# Patient Record
(demographics unavailable — no encounter records)

---

## 2024-10-14 NOTE — PLAN
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 and the need for gyn oncology referral. Likely anticipated plan of action reviewed with pt and sister. Gyn onc referral given.  F/u annually or as needed.

## 2024-10-14 NOTE — HISTORY OF PRESENT ILLNESS
[LMP unknown] : LMP unknown [postmenopausal] : postmenopausal [N] : Patient denies prior pregnancies [unknown] : Patient is unsure of the date of her LMP [Menarche Age: ____] : age at menarche was [unfilled] [Never active] : never active [TextBox_19] : Pt unsure of when and where she had her last Mammogram [PapSmeardate] : 08/26/2024 [TextBox_31] : Epithelial Cell Abnormality  [BoneDensityDate] : 06/02/2021 [TextBox_37] : Normal [TextBox_43] : Pt has never had a Colonoscopy [PGHxTotal] : 0 [FreeTextEntry1] : Hysterectomy: No   D&C: No Gardasil Vaccine: No

## 2024-10-14 NOTE — END OF VISIT
[FreeTextEntry3] : I, Jacque Borges solely acted as scribe for Dr. Leola Cristobal on 10/09/2024  All medical entries made by the Scribe were at my, Dr. Pepe, direction and personally dictated by me on 10/09/2024 . I have reviewed the chart and agree that the record accurately reflects my personal performance of the history, physical exam, assessment and plan. I have also personally directed, reviewed, and agreed with the chart.

## 2024-10-17 NOTE — CHIEF COMPLAINT
[Other: _____] : [unfilled] [FreeTextEntry1] : [facility] [address] [place] [id number] [phone]  Uterine carcinosarcoma

## 2024-10-17 NOTE — HISTORY OF PRESENT ILLNESS
[FreeTextEntry1] : 57yo G0 virginal female LMP 2021 referred by Dr. 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 Pt presented to GYN in August with period like PMB that occurred in July. Pap smear on 8/26/24 revealed ANTONIETA favor neoplastic. Underwent EMB/hysteroscopy on 9/25/24 and final pathology revealed carcinosarcoma. Denies unintentional weight loss, abdominal or pelvis pain, change in bladder or bowel habits, hematochezia.   Pelvic US 9/24/24-AV uterus 7.93 x 7.29 x 7.67cm, EMS=24mm with thickened, complex, heterogeneous appearance, complex structure see towards the ERNESTINE/Cervix, possible fibroid measuring 2.7 x 1.8 x 2.1cm, Unable to visualize Rt Ovary, Difficult to clearly visualize ovarian tissue, mostly simple Cyst seen measuring 3.2 x 2.9 x 3.3cm with solid appearing structure seen within measuring 1.4 x 1.0 x 1.6cm, additional suspected Para-Ovarian Cyst seen measuring 2.4 x 1.6 x 1.8cm, no free fluid noted. Difficult sonogram due to patient body habitus and patient unable to position properly.  Pathology 9/25/24- 1.  Endometrial biopsy      -   Carcinosarcoma 2.  Endometrial biopsy      -   Carcinosarcoma 3.  Endometrial biopsy      -   Carcinosarcoma  Immunohistochemical stains studies with MMR are pending.  Pap smear-8/26/24-atypical glandular cells favor neoplastic, HPV neg Mammo- 2021- WNL, per patient. Denies prior abnormal mammograms. Colonoscopy- Never DEXA-6/2/21- WNL

## 2024-10-17 NOTE — ASSESSMENT
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   I discussed with the patient and her sister my recommendation for surgical intervention with a hysterectomy at this time. I recommend she have CT C/A/P prior to surgery to rule out metastatic disease. We briefly discussed the role of adjuvant treatment but will defer until final pathology to discuss in great detail. Patient and her sister both expressed their understanding.  I discussed at length with the patient the nature, purpose, risks, benefits, and alternatives of total robotic hysterectomy with bilateral salpingo-oophorectomy, sentinel lymph node dissection, pelvic washings.  The patient understands the risks to include (but not be limited to) bowel injury, bleeding (with the possible need for transfusion), bladder or ureteral injury, infections, deep venous thrombosis, and lamont-operative death.  The patient also understands that her surgery may not be able to be performed laparoscopically and that she may need a laparotomy (either vertical midline or pfannensteil).  She also understands the limitations of laparoscopic surgery and the possibility of missing a surgical complication with need for subsequent re-exploration.  She agrees to proceed.  She asked numerous questions which were answered to her satisfaction. She also understands the rationale for a cystoscopy at the completion of the procedure and the potential risks of cystoscopy.  All questions and concerns were answered to patient's apparent satisfaction.

## 2024-10-17 NOTE — PHYSICAL EXAM
[Chaperone Present] : A chaperone was present in the examining room during all aspects of the physical examination [77081] : A chaperone was present during the pelvic exam. [Normal] : Bimanual Exam: Normal [Ambulatory and capable of all self care but unable to carry out any work activities] : Status 2- Ambulatory and capable of all self care but unable to carry out any work activities. Up and about more than 50% of waking hours [FreeTextEntry2] : Beatriz Johnson PA-C.

## 2024-10-17 NOTE — END OF VISIT
[FreeTextEntry3] : I, Dr. Swati Hudson, personally performed the evaluation and management (E/M) services for this new patient. That E/M includes conducting the initial examination, assessing all conditions, and establishing the plan of care. Today, Beatriz Johnson PA-C, was here to observe my evaluation and management services for this patient to be followed going forward.

## 2024-10-17 NOTE — CHIEF COMPLAINT
[Other: _____] : [unfilled] [FreeTextEntry1] : [facility] [address] [place] [address] [phone]  Uterine carcinosarcoma

## 2024-10-17 NOTE — ASSESSMENT
[FreeTextEntry1] : 57yo virginal female with newly diagnosed uterine carcinosarcoma.   I discussed with the patient and her sister my recommendation for surgical intervention with a hysterectomy at this time. I recommend she have CT C/A/P prior to surgery to rule out metastatic disease. We briefly discussed the role of adjuvant treatment but will defer until final pathology to discuss in great detail. Patient and her sister both expressed their understanding.  I discussed at length with the patient the nature, purpose, risks, benefits, and alternatives of total robotic hysterectomy with bilateral salpingo-oophorectomy, sentinel lymph node dissection, pelvic washings.  The patient understands the risks to include (but not be limited to) bowel injury, bleeding (with the possible need for transfusion), bladder or ureteral injury, infections, deep venous thrombosis, and lamont-operative death.  The patient also understands that her surgery may not be able to be performed laparoscopically and that she may need a laparotomy (either vertical midline or pfannensteil).  She also understands the limitations of laparoscopic surgery and the possibility of missing a surgical complication with need for subsequent re-exploration.  She agrees to proceed.  She asked numerous questions which were answered to her satisfaction. She also understands the rationale for a cystoscopy at the completion of the procedure and the potential risks of cystoscopy.  All questions and concerns were answered to patient's apparent satisfaction.

## 2024-10-17 NOTE — PLAN
[TextEntry] : CT C/A/P to rule out metastatic disease TRH, BSO, SLND, cystoscopy Consent signed in office Hysterectomy acknowledgement form signed Medical clearance required by PCP Neurology clearance required Surgical coordinator to reach out regarding scheduling PST labs: CBC, SMA-7,PTT/INR, EKG, CA/Phos/Mg, A1C Light breakfast the day before surgery, followed by clear liquids only Bowel prep information provided to patient

## 2024-11-20 NOTE — REASON FOR VISIT
[Post Op] : post op visit [de-identified] : 11/5/24 [de-identified] : RA TLH, BSO, SLND, Bilateral urterolysis, JAJA, omental biopsy, pelvic washing, mini-laparotomy, and cystoscopy [de-identified] : pt is 2W1D post op. Patient is recovering well, ambulating as tolerated, voiding spontaneously, having bowel movements and passing flatus. She is taking Naprosyn for pain control. Denies N/V, SOB, CP, fever, chills, and calf pain

## 2024-11-20 NOTE — ASSESSMENT
After Visit Summary   7/10/2017    Kirti Berry    MRN: [id number]           Patient Information     Date Of Birth          1982        Visit Information        Provider Department      7/10/2017 1:20 PM Ward Eisenberg MD [facility]        Today's Diagnoses     Encounter for gynecological examination without abnormal finding        Encounter for initial prescription of intrauterine contraceptive device (IUD)           Follow-ups after your visit        Who to contact     If you have questions or need follow up information about today's clinic visit or your schedule please contact [facility] directly at [phone].  Normal or non-critical lab and imaging results will be communicated to you by MyChart, letter or phone within 4 business days after the clinic has received the results. If you do not hear from us within 7 days, please contact the clinic through Daily Pichart or phone. If you have a critical or abnormal lab result, we will notify you by phone as soon as possible.  Submit refill requests through Digital Message Display or call your pharmacy and they will forward the refill request to us. Please allow 3 business days for your refill to be completed.          Additional Information About Your Visit        MyChart Information     Digital Message Display gives you secure access to your electronic health record. If you see a primary care provider, you can also send messages to your care team and make appointments. If you have questions, please call your primary care clinic.  If you do not have a primary care provider, please call [phone] and they will assist you.        Care EveryWhere ID     This is your Care EveryWhere ID. This could be used by other organizations to access your [facility] medical records  [id number]        Your Vitals Were     Pulse Temperature Respirations Last Period Pulse Oximetry BMI (Body Mass Index)    87 97.3  F (36.3  C) (Tympanic) 16 08/05/2016 (Approximate)  [FreeTextEntry1] : PT is a 59 yo with carcinosarcoma s/p TRH, BSO, SLND, omental biopsy, bilateral ureterolysis on 11/5/24. Pathology not finalized. Will recommend consultation with radiation oncology and medical oncology given pre-operative diagnosis. Follow up for vaginal cuff in 4 weeks. Recovering well.  99% 22.42 kg/m2       Blood Pressure from Last 3 Encounters:   07/10/17 116/74   07/06/17 110/64   05/18/17 102/65    Weight from Last 3 Encounters:   07/10/17 141 lb (64 kg)   07/06/17 136 lb 14.4 oz (62.1 kg)   05/08/17 157 lb 14.4 oz (71.6 kg)              We Performed the Following     HPV High Risk Types DNA Cervical     Insertion of an IUD     Pap imaged thin layer screen with HPV - recommended age 30 - 65 years (select HPV order below)     [facility]        Primary Care Provider Office Phone # Fax #    Mickey Gann [phone]       [facility] [address] [facility] [address]        Equal Access to Services     MILAGRO ANNE : Alexia suárezo Soeric, waaxda luqadaha, qaybta kaalmada adejayyachar, alejandra rojo. So [facility] [phone].    ATENCIÓN: Si habla español, tiene a olsen disposición servicios gratuitos de asistencia lingüística. Llame al [phone].    We comply with applicable federal civil rights laws and [place] laws. We do not discriminate on the basis of race, color, national origin, age, disability sex, sexual orientation or gender identity.            Thank you!     Thank you for choosing [facility]  for your care. Our goal is always to provide you with excellent care. Hearing back from our patients is one way we can continue to improve our services. Please take a few minutes to complete the written survey that you may receive in the mail after your visit with us. Thank you!             Your Updated Medication List - Protect others around you: Learn how to safely use, store and throw away your medicines at www.disposemymeds.org.          This list is accurate as of: 7/10/17  3:33 PM.  Always use your most recent med list.                   Brand Name Dispense Instructions for use Diagnosis    Fish Oil 645 MG Caps           prenatal multivitamin  plus iron 27-0.8 MG Tabs per tablet     100 tablet    Take 1 tablet by mouth  daily

## 2024-11-20 NOTE — DISCUSSION/SUMMARY
[Clean] : was clean [Dry] : was dry [Intact] : was intact [Erythema] : was not erythematous [Ecchymosis] : was not ecchymotic [Seroma] : had no seroma [None] : had no drainage [Normal Skin] : normal appearance [Firm] : soft [Tender] : nontender [Abnormal Bowel Sounds] : normal bowel sounds [Rebound] : no rebound tenderness [Guarding] : no guarding [Incisional Hernia] : no incisional hernia [Mass] : no palpable mass [Doing Well] : is doing well [Excellent Pain Control] : has excellent pain control [No Sign of Infection] : is showing no signs of infection [de-identified] : Pathology pending.  Final Diagnosis PELVIC WASH NEGATIVE FOR MALIGNANT CELLS [FreeTextEntry1] : Pertinent Findings:  Uterus sounded to 10 cm, right ovary adherent along the right pelvis, left ovary with visible surface tumor implants, normal bilateral fallopian tubes, no implants in the pelvis, omentum or upper abdomen. Dense adhesions of the sigmoid colon along the left pelvis and adherent to the posterior uterus at the level of left uterosacral ligament with pocket of endometriosis. Bilateral external iliac sentinel lymph nodes. No para-aortic sentinel lymph nodes. No enlarged lymph nodes. Mini-laparotomy for uterine specimen extraction (466 gm). Cystoscopy: normal bilateral ureteral jets, no injury to the bladder and no lesions.

## 2024-11-20 NOTE — ASSESSMENT
[FreeTextEntry1] : PT is a 57 yo with carcinosarcoma s/p TRH, BSO, SLND, omental biopsy, bilateral ureterolysis on 11/5/24. Pathology not finalized. Will recommend consultation with radiation oncology and medical oncology given pre-operative diagnosis. Follow up for vaginal cuff in 4 weeks. Recovering well.

## 2024-11-20 NOTE — END OF VISIT
[FreeTextEntry3] : RTC in 4 weeks for cuff check Medical oncology consultation/Radiation oncology consultation  [FreeTextEntry2] :  GEE Crespo was present the entire duration of the patient interaction and gynecological exam.

## 2024-11-20 NOTE — REASON FOR VISIT
[Post Op] : post op visit [de-identified] : 11/5/24 [de-identified] : RA TLH, BSO, SLND, Bilateral urterolysis, JAJA, omental biopsy, pelvic washing, mini-laparotomy, and cystoscopy [de-identified] : pt is 2W1D post op. Patient is recovering well, ambulating as tolerated, voiding spontaneously, having bowel movements and passing flatus. She is taking Naprosyn for pain control. Denies N/V, SOB, CP, fever, chills, and calf pain

## 2024-11-20 NOTE — DISCUSSION/SUMMARY
[Clean] : was clean [Dry] : was dry [Intact] : was intact [Erythema] : was not erythematous [Ecchymosis] : was not ecchymotic [Seroma] : had no seroma [None] : had no drainage [Normal Skin] : normal appearance [Firm] : soft [Tender] : nontender [Abnormal Bowel Sounds] : normal bowel sounds [Rebound] : no rebound tenderness [Guarding] : no guarding [Incisional Hernia] : no incisional hernia [Mass] : no palpable mass [Doing Well] : is doing well [Excellent Pain Control] : has excellent pain control [No Sign of Infection] : is showing no signs of infection [de-identified] : Pathology pending.  Final Diagnosis PELVIC WASH NEGATIVE FOR MALIGNANT CELLS [FreeTextEntry1] : Pertinent Findings:  Uterus sounded to 10 cm, right ovary adherent along the right pelvis, left ovary with visible surface tumor implants, normal bilateral fallopian tubes, no implants in the pelvis, omentum or upper abdomen. Dense adhesions of the sigmoid colon along the left pelvis and adherent to the posterior uterus at the level of left uterosacral ligament with pocket of endometriosis. Bilateral external iliac sentinel lymph nodes. No para-aortic sentinel lymph nodes. No enlarged lymph nodes. Mini-laparotomy for uterine specimen extraction (466 gm). Cystoscopy: normal bilateral ureteral jets, no injury to the bladder and no lesions.

## 2024-11-27 NOTE — HISTORY OF PRESENT ILLNESS
[FreeTextEntry1] : The patient is a pleasant 58 year old female diagnosed with endometrial carcinosarcoma, referred by Dr. Hudson.  Pt presented to GYN in August with period like PMB that occurred in July. Pap smear on 8/26/24 revealed ANTONIETA favor neoplastic. Underwent EMB/hysteroscopy on 9/25/24 and final pathology revealed carcinosarcoma. Denies unintentional weight loss, abdominal or pelvis pain, change in bladder or bowel habits, hematochezia.  Pelvic US per Dr. Hudson note: 9/24/24-AV uterus 7.93 x 7.29 x 7.67cm, EMS=24mm with thickened, complex, heterogeneous appearance, complex structure see towards the ERNESTINE/Cervix, possible fibroid measuring 2.7 x 1.8 x 2.1cm, Unable to visualize Rt Ovary, Difficult to clearly visualize ovarian tissue, mostly simple Cyst seen measuring 3.2 x 2.9 x 3.3cm with solid appearing structure seen within measuring 1.4 x 1.0 x 1.6cm, additional suspected Para-Ovarian Cyst seen measuring 2.4 x 1.6 x 1.8cm, no free fluid noted. Difficult sonogram due to patient body habitus and patient unable to position properly.  Pathology 9/25/24- 1. Endometrial biopsy  - Carcinosarcoma, slides verified by [facility] pathology.  Pap smear-8/26/24-atypical glandular cells favor neoplastic, HPV neg She has never had colonoscopy  CT abdomen and pelvis 10/23/24 showed: Nonspecific tiny nodules in the lungs Right adrenal mass. Further evaluation with MRI or triphasic CT may be of benefit. Complex endometrial mass consistent with clinical history of carcinosarcoma Left adnexal above fluid density lesions. Correlation with pelvic ultrasound or MRI may be of benefit if not already performed.  MRI abdomen 10/30/24 showed:  Right adrenal adenoma. Choledocholithiasis with mild dilatation of the common bile duct. No intrahepatic biliary dilatation. Cholelithiasis without evidence of acute cholecystitis.   She is status post RA TLH, BSO, SLND, Bilateral urterolysis, JAJA, omental biopsy, pelvic washing, mini-laparotomy, and cystoscopy on 11/05/2024 by Dr Hudson. Pathology demonstrated: Endometrial carcinosarcoma, myometrial invasion present, depth 0.5 mm with myometrial thickness 1.5mm ( 33%). Serosa not involved, lower uterine segment invasion present= myoinvasive. Peritoneal ascitic fluid free of cancerous cells. LVI present. 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  Patient presents today to discuss the role of radiation therapy in her care.

## 2025-02-07 NOTE — HISTORY OF PRESENT ILLNESS
[Home] : at home, [unfilled] , at the time of the visit. [Other Location: e.g. Home (Enter Location, City,State)___] : at [unfilled] [Telehealth (audio & video)] : This visit was provided via telehealth using real-time 2-way audio visual technology. 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 She has been in rehab and had set backs and has not started radiation treatment. She has not followed up for a vaginal cuff exam. She reports one episode of bleeding but unclear if from rectum/vagina. No nausea/vomiting, no fevers/chills otherwise tolerating normal diet and doing well.   Indication: transportation issue

## 2025-02-07 NOTE — ASSESSMENT
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 She had slow recovery and was in a rehabilitation fascility. She will be going for radiation oncology consultation to discuss adjuvant treatment. We discussed that without adjuvant treatment risk of recurrence very high.   Given new episode of bleeding she needs to follow up for examination. I would recommend seeing the radiation oncologist and if there is any recurrence she needs to start treatment ASAP. We discussed chemotherapy would be preferable option, but they are trying to avoid it.

## 2025-02-07 NOTE — END OF VISIT
[FreeTextEntry3] : Follow up with Radiation oncology  Follow up in clinic for pelvic exam [Time Spent: ___ minutes] : I have spent [unfilled] minutes of time on the encounter which excludes teaching and separately reported services.

## 2025-02-20 NOTE — HISTORY OF PRESENT ILLNESS
[FreeTextEntry1] : The patient is a pleasant 58 year old female diagnosed with endometrial carcinosarcoma, referred by Dr. Hudson.  Pt presented to GYN in August with period like PMB that occurred in July. Pap smear on 8/26/24 revealed ANTONIETA favor neoplastic. Underwent EMB/hysteroscopy on 9/25/24 and final pathology revealed carcinosarcoma. Denies unintentional weight loss, abdominal or pelvis pain, change in bladder or bowel habits, hematochezia.  Pelvic US per Dr. Hudson note: 9/24/24-AV uterus 7.93 x 7.29 x 7.67cm, EMS=24mm with thickened, complex, heterogeneous appearance, complex structure see towards the ERNESTINE/Cervix, possible fibroid measuring 2.7 x 1.8 x 2.1cm, Unable to visualize Rt Ovary, Difficult to clearly visualize ovarian tissue, mostly simple Cyst seen measuring 3.2 x 2.9 x 3.3cm with solid appearing structure seen within measuring 1.4 x 1.0 x 1.6cm, additional suspected Para-Ovarian Cyst seen measuring 2.4 x 1.6 x 1.8cm, no free fluid noted. Difficult sonogram due to patient body habitus and patient unable to position properly.  Pathology 9/25/24- 1. Endometrial biopsy  - Carcinosarcoma, slides verified by [facility] pathology.  Pap smear-8/26/24-atypical glandular cells favor neoplastic, HPV neg She has never had colonoscopy  CT abdomen and pelvis 10/23/24 showed: Nonspecific tiny nodules in the lungs Right adrenal mass. Further evaluation with MRI or triphasic CT may be of benefit. Complex endometrial mass consistent with clinical history of carcinosarcoma Left adnexal above fluid density lesions. Correlation with pelvic ultrasound or MRI may be of benefit if not already performed.  MRI abdomen 10/30/24 showed:  Right adrenal adenoma. Choledocholithiasis with mild dilatation of the common bile duct. No intrahepatic biliary dilatation. Cholelithiasis without evidence of acute cholecystitis.  She is status post RA TLH, BSO, SLND, Bilateral urterolysis, JAJA, omental biopsy, pelvic washing, mini-laparotomy, and cystoscopy on 11/05/2024 by Dr Hudson. Pathology demonstrated: Endometrial carcinosarcoma, myometrial invasion present, depth 0.5 mm with myometrial thickness 1.5mm ( 33%). Serosa not involved, lower uterine segment invasion present= myoinvasive. Peritoneal ascitic fluid free of cancerous cells. LVI present. 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  Patient presents today to discuss the role of radiation therapy in her care.

## 2025-02-26 NOTE — ASSESSMENT
[Curative] : Goals of care discussed with patient: Curative [FreeTextEntry1] : Complete staging with total hysterectomy and bilateral salpingo-oophorectomy (BSO) is required for patients with stage II or higher disease. For patients with borderline ovarian tumors who are postmenopausal or who do not wish to preserve fertility nor endocrine function, a full staging procedure with total hysterectomy-BSO is typically performed. Informed consent should include counseling premenopausal patients about the symptoms, effects, and management options for premature menopause. (See 'Advanced disease' above.)  -For patients with an apparent unilateral stage I borderline ovarian tumor, we suggest salpingo-oophorectomy of the affected ovary, pelvic washings, an omental biopsy, and a biopsy of any peritoneal lesions rather than full staging for ovarian cancer (Grade 2C). Appendectomy is performed for mucinous tumors. BSO is required in patients with bilateral borderline ovarian tumors. (See 'Desire fertility preservation' above.)  Chemotherapy - The use of adjuvant chemotherapy is controversial; there is no advantage to treatment of patients with early-stage disease. While patients with more advanced disease may be rendered disease-free, survival benefit related to the administration of adjuvant chemotherapy has not been clearly demonstrated. We recommend chemotherapy after aggressive surgical debulking only if invasive implants are identified (Grade 1B). (See 'Chemotherapy' above.)  Prognosis - Borderline tumors have a good prognosis. The risk of malignant transformation is unclear. Progression to invasive cancer may represent true transformation, de kevin development of an ovarian cancer, or a peritoneal cancer. (See 'Survival' above and 'Progression' above.)

## 2025-02-28 NOTE — DISEASE MANAGEMENT
[Pathological] : TNM Stage: p [FreeTextEntry4] : carcinosarcoma of uterus [TTNM] : 1a [NTNM] : 0 [MTNM] : 0 [I] : I

## 2025-02-28 NOTE — REVIEW OF SYSTEMS
[Negative] : Allergic/Immunologic [Constipation: Grade 1 - Occasional or intermittent symptoms; occasional use of stool softeners, laxatives, dietary modification, or enema] : Constipation: Grade 1 - Occasional or intermittent symptoms; occasional use of stool softeners, laxatives, dietary modification, or enema [Diarrhea: Grade 0] : Diarrhea: Grade 0 [Nausea: Grade 0] : Nausea: Grade 0 [Vomiting: Grade 0] : Vomiting: Grade 0 [Fatigue: Grade 0] : Fatigue: Grade 0 [Urinary Urgency: Grade 0] : Urinary Urgency: Grade 0 [Urinary Frequency: Grade 0] : Urinary Frequency: Grade 0 [Genital Edema: Grade 0] : Genital Edema: Grade 0 [Vaginal Infection: Grade 0] : Vaginal Infection: Grade 0  [Dizziness: Grade 0] : Dizziness: Grade 0  [Headache: Grade 0] : Headache: Grade 0 [Lethargy: Grade 0] : Lethargy: Grade 0

## 2025-02-28 NOTE — HISTORY OF PRESENT ILLNESS
[Family Member] : family member [Home] : at home, [unfilled] , at the time of the visit. [Medical Office: ([facility])___] : at the medical office located in  [Telehealth (audio & video)] : This visit was provided via telehealth using real-time 2-way audio visual technology. 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 Hudson.  Pt presented to GYN in August with vaginal bleeding that occurred in July. Pap smear on 8/26/24 revealed ANTONIETA favor neoplastic. Underwent EMB/hysteroscopy on 9/25/24 and final pathology revealed carcinosarcoma. Denies unintentional weight loss, abdominal or pelvis pain, change in bladder or bowel habits, hematochezia.  Pelvic US per Dr. Hudson note: 9/24/24-AV uterus 7.93 x 7.29 x 7.67cm, EMS=24mm with thickened, complex, heterogeneous appearance, complex structure see towards the ERNESTINE/Cervix, possible fibroid measuring 2.7 x 1.8 x 2.1cm, Unable to visualize Rt Ovary, Difficult to clearly visualize ovarian tissue, mostly simple Cyst seen measuring 3.2 x 2.9 x 3.3cm with solid appearing structure seen within measuring 1.4 x 1.0 x 1.6cm, additional suspected Para-Ovarian Cyst seen measuring 2.4 x 1.6 x 1.8cm, no free fluid noted. Difficult sonogram due to patient body habitus and patient unable to position properly.  Pathology 9/25/24- 1. Endometrial biopsy  - Carcinosarcoma, slides verified by [facility] pathology.  Pap smear-8/26/24-atypical glandular cells favor neoplastic, HPV neg She has never had colonoscopy  CT abdomen and pelvis 10/23/24 showed: Nonspecific tiny nodules in the lungs Right adrenal mass. Further evaluation with MRI or triphasic CT may be of benefit. Complex endometrial mass consistent with clinical history of carcinosarcoma Left adnexal above fluid density lesions. Correlation with pelvic ultrasound or MRI may be of benefit if not already performed.  MRI abdomen 10/30/24 showed:  Right adrenal adenoma. Choledocholithiasis with mild dilatation of the common bile duct. No intrahepatic biliary dilatation. Cholelithiasis without evidence of acute cholecystitis.  She is status post RA TLH, BSO, SLNB, Bilateral urterolysis, JAJA, omental biopsy, pelvic washing, mini-laparotomy, and cystoscopy on 11/05/2024 by Dr Hudson. Pathology demonstrated: Endometrial carcinosarcoma, myometrial invasion present, depth 0.5 mm with myometrial thickness 1.5mm ( 33%). Serosa not involved, lower uterine segment invasion present. Peritoneal ascitic fluid free of cancerous cells. (pelvic washings negative) LVI present. 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  2/28/25 Patient presents today via telehealth to discuss the role of radiation therapy in her care. Patient was in rehab (12/4/24-2/6/25) she had multiple UTI's, and norovirus.  Patient is on disability since 1992 (anxiety, bipolar). Sister present during call. As per sister patient denies pelvic bleeding. Denies vaginal pain or discomfort at this time. Reports occasional constipation, takes Colace as needed. Saw Med/Onc Dr. Vela on 2/26/25, who ordered CT chest/abd/pel to be performed next week.

## 2025-02-28 NOTE — HISTORY OF PRESENT ILLNESS
[Family Member] : family member [Home] : at home, [unfilled] , at the time of the visit. [Medical Office: ([facility])___] : at the medical office located in  [Telehealth (audio & video)] : This visit was provided via telehealth using real-time 2-way audio visual technology. 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 Hudson.  Pt presented to GYN in August with vaginal bleeding that occurred in July. Pap smear on 8/26/24 revealed ANTONIETA favor neoplastic. Underwent EMB/hysteroscopy on 9/25/24 and final pathology revealed carcinosarcoma. Denies unintentional weight loss, abdominal or pelvis pain, change in bladder or bowel habits, hematochezia.  Pelvic US per Dr. Hudson note: 9/24/24-AV uterus 7.93 x 7.29 x 7.67cm, EMS=24mm with thickened, complex, heterogeneous appearance, complex structure see towards the ERNESTINE/Cervix, possible fibroid measuring 2.7 x 1.8 x 2.1cm, Unable to visualize Rt Ovary, Difficult to clearly visualize ovarian tissue, mostly simple Cyst seen measuring 3.2 x 2.9 x 3.3cm with solid appearing structure seen within measuring 1.4 x 1.0 x 1.6cm, additional suspected Para-Ovarian Cyst seen measuring 2.4 x 1.6 x 1.8cm, no free fluid noted. Difficult sonogram due to patient body habitus and patient unable to position properly.  Pathology 9/25/24- 1. Endometrial biopsy  - Carcinosarcoma, slides verified by [facility] pathology.  Pap smear-8/26/24-atypical glandular cells favor neoplastic, HPV neg She has never had colonoscopy  CT abdomen and pelvis 10/23/24 showed: Nonspecific tiny nodules in the lungs Right adrenal mass. Further evaluation with MRI or triphasic CT may be of benefit. Complex endometrial mass consistent with clinical history of carcinosarcoma Left adnexal above fluid density lesions. Correlation with pelvic ultrasound or MRI may be of benefit if not already performed.  MRI abdomen 10/30/24 showed:  Right adrenal adenoma. Choledocholithiasis with mild dilatation of the common bile duct. No intrahepatic biliary dilatation. Cholelithiasis without evidence of acute cholecystitis.  She is status post RA TLH, BSO, SLNB, Bilateral urterolysis, JAJA, omental biopsy, pelvic washing, mini-laparotomy, and cystoscopy on 11/05/2024 by Dr Hudson. Pathology demonstrated: Endometrial carcinosarcoma, myometrial invasion present, depth 0.5 mm with myometrial thickness 1.5mm ( 33%). Serosa not involved, lower uterine segment invasion present. Peritoneal ascitic fluid free of cancerous cells. (pelvic washings negative) LVI present. 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  2/28/25 Patient presents today via telehealth to discuss the role of radiation therapy in her care. Patient was in rehab (12/4/24-2/6/25) she had multiple UTI's, and norovirus.  Patient is on disability since 1992 (anxiety, bipolar). Sister present during call. As per sister patient denies pelvic bleeding. Denies vaginal pain or discomfort at this time. Reports occasional constipation, takes Colace as needed. Saw Med/Onc Dr. Vela on 2/26/25, who ordered CT chest/abd/pel to be performed next week.

## 2025-03-02 NOTE — CONSULT LETTER
[Dear  ___] : Dear  [unfilled], [Consult Letter:] : I had the pleasure of evaluating your patient, [unfilled]. [Please see my note below.] : Please see my note below. [Consult Closing:] : Thank you very much for allowing me to participate in the care of this patient.  If you have any questions, please do not hesitate to contact me. [Sincerely,] : Sincerely, [DrFarhana  ___] : Dr. NEWELL [DrFarhana ___] : Dr. NEWELL [FreeTextEntry3] : Dr. Amber Vela

## 2025-03-02 NOTE — RESULTS/DATA
[FreeTextEntry1] : 11/5/24 Path: Pelvic wash: negative for malignant cells.   11/5/24 Path: [place] lymph node, left external iliac, dissection: One benign lymph node (0/1). [place] lymph node, right external iliac, dissection: One benign lymph node (0/1). Omentum, biopsy: Benign mature adipose. 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 Lymphovascular invasion present. Surgical resection margins negative for tumor. Left ovary with serous borderline tumor, pT1c2 pN0. Benign right ovary and bilateral fallopian tubes. Tissue shows an aberrant p53 null phenotype in the carcinomatous and sarcomatous components. Synoptic Summary Ovary, Fallopian Tube, or Primary Perito Procedure: Total hysterectomy and bilateral salpingo-oophorectomy Left Ovary Integrity: Capsule intact Tumor Site: Left ovary Tumor Size: 1.5 Centimeters (cm) Histologic Type: Serous borderline tumor Ovarian Surface Involvement: Present, left Fallopian Tube Surface Involvement: Not identified Other Tissue / Organ Involvement: Not identified Peritoneal / Ascitic Fluid Involvement: Negative for malignant cells Regional Lymph Node Status: All regional lymph nodes negative for tumor cells Number of Lymph Nodes Examined: 2 pTNM Classification (AJCC 8th Edition) pT Category: pT1c2 pN Category: pN0 Additional Findings Additional Findings: See associated synoptic report for part 4  CAP eCP 2024 Q2 Release 4: Endometrium - Hysterectomy Procedure: Total hysterectomy and bilateral salpingo-oophorectomy Tumor Site:  Endometrium: Lower uterine segment Histologic Type: Carcinosarcoma Myometrial Invasion: Present Depth of Myometrial Invasion: 0.5 mm Myometrial Thickness: 1.5 mm Percentage of Myometrial Invasion: 33% Adenomyosis: Not identified Uterine Serosa Involvement: Not identified Lower Uterine Segment Involvement: Present, myoinvasive Other Tissue / Organ Involvement: Not identified Peritoneal / Ascitic Fluid: Negative for malignant cells The International System for Reporting Serous Fluid Cytopathology: Negative for malignancy (NFM) Lymphatic and / or Vascular Invasion: Present Margin Status: All margins negative for invasive carcinoma Regional Lymph Node Status:   All regional lymph nodes negative for tumor cells Lymph Nodes Examined Total Number of Pelvic Nodes Examined: 2 Number of Pelvic [place] Nodes Examined:  2 Total Number of Para-aortic Nodes Examined:  0 Number of Para-aortic [place] Nodes Examined: 0 pTNM Classification pT Category: pT1a pN Category: pN0 Additional Findings: Serous Borderline Tumor of the left ovary, see synoptic summary; focal endometriosis of uterine serosa; benign leiomyomata, calcific atherosclerosis.  10/30/24 MRI Abd: Right adrenal adenoma. Choledocholithiasis with mild dilatation of the common bile duct. No intrahepatic biliary dilatation. Cholelithiasis without evidence of acute cholecystitis.  10/23/24 CT A/P: Nonspecific tiny nodules in the lungs, 2 mm. Right adrenal mass, 3.7 x 2.6 cm. Further evaluation with MRI or triphasic CT may be of benefit. Complex endometrial mass consistent with clinical history of carcinosarcoma, 9 x 4 cm. Left adnexal above fluid density lesions, 3.7 cm and 2.2 cm. Correlation with pelvic ultrasound or MRI may be of benefit if not already performed.  9/25/24 Path: 1. Endometrial biopsy: Carcinosarcoma. 2. Endometrial biopsy: Carcinosarcoma. 3. Endometrial biopsy: Carcinosarcoma. No loss of nuclear expression of MMR proteins (MLH1, MSH2, MSH6, and PMS2). Immunohistochemical stains of the tumor: p53 null (aberrant), p16 positive in the epithelial component. ER and VT positive. The sarcomatous component has areas with liposarcomatous differentiation, with immunohistochemical stain S100 is patchy positive.   9/24/24 Transvaginal US: Heterogeneous uterus. Complex structure see towards the ERNESTINE/Cervix. Possible fibroid measuring 2.7 x 1.8 x 2.1cm. Endometrial lining measures 24.4--36.4mm (TV and TA measurements). Endometrium has a thickened, complex-heterogeneous appearance.  Blood flow seen minimally throughout with color doppler. Unable to visualize Rt Ovary. Difficult to clearly visualize Ovarian tissue. Mostly Simple Cyst seen measuring 3.2 x 2.9 x 3.3cm with a solid appearing structure seen within measuring 1.4 x 1.0 x 1.6cm. Additional suspected Para-Ovarian Cyst seen measuring 2.4 x 1.6 x 1.8cm. No free fluid noted. Difficult sonogram due to patient body habitu and patient unable to position properly.  8/26/24 PAP: Epithelial cell abnormality. Atypical glandular cells present, favor neoplastic. Advised tissue biopsy for definitive diagnosis.

## 2025-03-02 NOTE — HISTORY OF PRESENT ILLNESS
[de-identified] : The patient was diagnosed with endometrial cancer in September 2024 at the age of 58. She presented to GYN in August for post-menopausal bleeding and on 8/26/24 a PAP showed epithelial cell abnormality. Atypical glandular cells present, favor neoplastic. On 9/24/24 she had a transvaginal US: Heterogeneous uterus. Complex structure seen towards the ERNESTINE/Cervix. Possible fibroid measuring 2.7 x 1.8 x 2.1cm. Endometrial lining measures 24.4--36.4mm (TV and TA measurements). Endometrium has a thickened, complex-heterogeneous appearance. On 9/25/24 she underwent 3 endometrial biopsies, and all pathology showed carcinosarcoma. No loss of nuclear expression of MMR proteins (MLH1, MSH2, MSH6, and PMS2). Immunohistochemical stains of the tumor: p53 null (aberrant), p16 positive in the epithelial component. ER and TN positive. The sarcomatous component has areas with liposarcomatous differentiation, with immunohistochemical stain S100 is patchy positive. On 10/23/24 she had a CT A/P that showed nonspecific tiny nodules in the lungs, 2 mm. Right adrenal mass, 3.7 x 2.6 cm. Complex endometrial mass consistent with clinical history of carcinosarcoma, 9 x 4 cm. Left adnexal above fluid density lesions, 3.7 cm and 2.2 cm. On 10/30/24 she had a follow up MRI abdomen that showed right adrenal adenoma. Choledocholithiasis with mild dilatation of the common bile duct. No intrahepatic biliary dilatation. Cholelithiasis without evidence of acute cholecystitis. On 11/5/24 she is status post a RA TLH, BSO, SLND, Bilateral urterolysis, JAJA, omental biopsy, pelvic washing, mini-laparotomy, and cystoscopy, and pathology showed [place] lymph node, left external iliac, dissection: One benign lymph node (0/1). [place] lymph node, right external iliac, dissection: One benign lymph node (0/1). Omentum, biopsy: Benign mature adipose. 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 Lymphovascular invasion present. Surgical resection margins negative for tumor. Left ovary with serous borderline tumor, pT1c2 pN0. Benign right ovary and bilateral fallopian tubes. Tissue shows an aberrant p53 null phenotype in the carcinomatous and sarcomatous components. Pelvic wash: negative for malignant cells.  [de-identified] : Medical Hx: Asthma; HTN; HL; DM 2; herniated discs; bipolar d/o; hypothyroidism; seizure d/o (last seizure 20 years ago, followed by neurology); eczema  Surgical Hx: no prior surgeries  Family Hx: maternal uncle prostrate, 60's; maternal uncle, thyroid ca Social Hx: never smoker; ETOH never; single, lives with her sister; disabled [de-identified] : While in rehab (12/4/24-2/6/25) she had multiple UTI's and norovirus. She has followed with uro-gyn and medication needed for chronic UTI's is too expensive.   Today she continues with pelvic bleeding, unsure if hemorrhoids, vaginal bleeding or in her urine. She denies pain. See full review of systems below.

## 2025-03-02 NOTE — REVIEW OF SYSTEMS
[Diarrhea: Grade 0] : Diarrhea: Grade 0 [Vaginal Discharge] : vaginal discharge [Dysmenorrhea/Abn Vaginal Bleeding] : dysmenorrhea/abnormal vaginal bleeding [Negative] : Allergic/Immunologic [Recent Change In Weight] : ~T no recent weight change [Chest Pain] : no chest pain [Shortness Of Breath] : no shortness of breath [Abdominal Pain] : no abdominal pain [Constipation] : no constipation [FreeTextEntry2] : wt stable

## 2025-03-02 NOTE — PHYSICAL EXAM
[Restricted in physically strenuous activity but ambulatory and able to carry out work of a light or sedentary nature] : Status 1- Restricted in physically strenuous activity but ambulatory and able to carry out work of a light or sedentary nature, e.g., light house work, office work [Normal] : affect appropriate [de-identified] : wt stable  [de-identified] : well healed abd incisions

## 2025-03-02 NOTE — RESULTS/DATA
[FreeTextEntry1] : 11/5/24 Path: Pelvic wash: negative for malignant cells.   11/5/24 Path: [place] lymph node, left external iliac, dissection: One benign lymph node (0/1). [place] lymph node, right external iliac, dissection: One benign lymph node (0/1). Omentum, biopsy: Benign mature adipose. 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 Lymphovascular invasion present. Surgical resection margins negative for tumor. Left ovary with serous borderline tumor, pT1c2 pN0. Benign right ovary and bilateral fallopian tubes. Tissue shows an aberrant p53 null phenotype in the carcinomatous and sarcomatous components. Synoptic Summary Ovary, Fallopian Tube, or Primary [place] Procedure: Total hysterectomy and bilateral salpingo-oophorectomy Left Ovary Integrity: Capsule intact Tumor Site: Left ovary Tumor Size: 1.5 Centimeters (cm) Histologic Type: Serous borderline tumor Ovarian Surface Involvement: Present, left Fallopian Tube Surface Involvement: Not identified Other Tissue / Organ Involvement: Not identified Peritoneal / Ascitic Fluid Involvement: Negative for malignant cells Regional Lymph Node Status: All regional lymph nodes negative for tumor cells Number of Lymph Nodes Examined: 2 pTNM Classification (AJCC 8th Edition) pT Category: pT1c2 pN Category: pN0 Additional Findings Additional Findings: See associated synoptic report for part 4  CAP eCP 2024 Q2 Release 4: Endometrium - Hysterectomy Procedure: Total hysterectomy and bilateral salpingo-oophorectomy Tumor Site:  Endometrium: Lower uterine segment Histologic Type: Carcinosarcoma Myometrial Invasion: Present Depth of Myometrial Invasion: 0.5 mm Myometrial Thickness: 1.5 mm Percentage of Myometrial Invasion: 33% Adenomyosis: Not identified Uterine Serosa Involvement: Not identified Lower Uterine Segment Involvement: Present, myoinvasive Other Tissue / Organ Involvement: Not identified Peritoneal / Ascitic Fluid: Negative for malignant cells The International System for Reporting Serous Fluid Cytopathology: Negative for malignancy (NFM) Lymphatic and / or Vascular Invasion: Present Margin Status: All margins negative for invasive carcinoma Regional Lymph Node Status:   All regional lymph nodes negative for tumor cells Lymph Nodes Examined Total Number of Pelvic Nodes Examined: 2 Number of Pelvic [place] Nodes Examined:  2 Total Number of Para-aortic Nodes Examined:  0 Number of Para-aortic [place] Nodes Examined: 0 pTNM Classification pT Category: pT1a pN Category: pN0 Additional Findings: Serous Borderline Tumor of the left ovary, see synoptic summary; focal endometriosis of uterine serosa; benign leiomyomata, calcific atherosclerosis.  10/30/24 MRI Abd: Right adrenal adenoma. Choledocholithiasis with mild dilatation of the common bile duct. No intrahepatic biliary dilatation. Cholelithiasis without evidence of acute cholecystitis.  10/23/24 CT A/P: Nonspecific tiny nodules in the lungs, 2 mm. Right adrenal mass, 3.7 x 2.6 cm. Further evaluation with MRI or triphasic CT may be of benefit. Complex endometrial mass consistent with clinical history of carcinosarcoma, 9 x 4 cm. Left adnexal above fluid density lesions, 3.7 cm and 2.2 cm. Correlation with pelvic ultrasound or MRI may be of benefit if not already performed.  9/25/24 Path: 1. Endometrial biopsy: Carcinosarcoma. 2. Endometrial biopsy: Carcinosarcoma. 3. Endometrial biopsy: Carcinosarcoma. No loss of nuclear expression of MMR proteins (MLH1, MSH2, MSH6, and PMS2). Immunohistochemical stains of the tumor: p53 null (aberrant), p16 positive in the epithelial component. ER and SD positive. The sarcomatous component has areas with liposarcomatous differentiation, with immunohistochemical stain S100 is patchy positive.   9/24/24 Transvaginal US: Heterogeneous uterus. Complex structure see towards the ERNESTINE/Cervix. Possible fibroid measuring 2.7 x 1.8 x 2.1cm. Endometrial lining measures 24.4--36.4mm (TV and TA measurements). Endometrium has a thickened, complex-heterogeneous appearance.  Blood flow seen minimally throughout with color doppler. Unable to visualize Rt Ovary. Difficult to clearly visualize Ovarian tissue. Mostly Simple Cyst seen measuring 3.2 x 2.9 x 3.3cm with a solid appearing structure seen within measuring 1.4 x 1.0 x 1.6cm. Additional suspected Para-Ovarian Cyst seen measuring 2.4 x 1.6 x 1.8cm. No free fluid noted. Difficult sonogram due to patient body habitu and patient unable to position properly.  8/26/24 PAP: Epithelial cell abnormality. Atypical glandular cells present, favor neoplastic. Advised tissue biopsy for definitive diagnosis.

## 2025-03-02 NOTE — HISTORY OF PRESENT ILLNESS
[de-identified] : The patient was diagnosed with endometrial cancer in September 2024 at the age of 58. She presented to GYN in August for post-menopausal bleeding and on 8/26/24 a PAP showed epithelial cell abnormality. Atypical glandular cells present, favor neoplastic. On 9/24/24 she had a transvaginal US: Heterogeneous uterus. Complex structure seen towards the ERNESTINE/Cervix. Possible fibroid measuring 2.7 x 1.8 x 2.1cm. Endometrial lining measures 24.4--36.4mm (TV and TA measurements). Endometrium has a thickened, complex-heterogeneous appearance. On 9/25/24 she underwent 3 endometrial biopsies, and all pathology showed carcinosarcoma. No loss of nuclear expression of MMR proteins (MLH1, MSH2, MSH6, and PMS2). Immunohistochemical stains of the tumor: p53 null (aberrant), p16 positive in the epithelial component. ER and [place] positive. The sarcomatous component has areas with liposarcomatous differentiation, with immunohistochemical stain S100 is patchy positive. On 10/23/24 she had a CT A/P that showed nonspecific tiny nodules in the lungs, 2 mm. Right adrenal mass, 3.7 x 2.6 cm. Complex endometrial mass consistent with clinical history of carcinosarcoma, 9 x 4 cm. Left adnexal above fluid density lesions, 3.7 cm and 2.2 cm. On 10/30/24 she had a follow up MRI abdomen that showed right adrenal adenoma. Choledocholithiasis with mild dilatation of the common bile duct. No intrahepatic biliary dilatation. Cholelithiasis without evidence of acute cholecystitis. On 11/5/24 she is status post a RA TLH, BSO, SLND, Bilateral urterolysis, JAJA, omental biopsy, pelvic washing, mini-laparotomy, and cystoscopy, and pathology showed sentinel lymph node, left external iliac, dissection: One benign lymph node (0/1). [place] lymph node, right external iliac, dissection: One benign lymph node (0/1). Omentum, biopsy: Benign mature adipose. 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 Lymphovascular invasion present. Surgical resection margins negative for tumor. Left ovary with serous borderline tumor, pT1c2 pN0. Benign right ovary and bilateral fallopian tubes. Tissue shows an aberrant p53 null phenotype in the carcinomatous and sarcomatous components. Pelvic wash: negative for malignant cells.  [de-identified] : Medical Hx: Asthma; HTN; HL; DM 2; herniated discs; bipolar d/o; hypothyroidism; seizure d/o (last seizure 20 years ago, followed by neurology); eczema  Surgical Hx: no prior surgeries  Family Hx: maternal uncle prostrate, 60's; maternal uncle, thyroid ca Social Hx: never smoker; ETOH never; single, lives with her sister; disabled [de-identified] : While in rehab (12/4/24-2/6/25) she had multiple UTI's and norovirus. She has followed with uro-gyn and medication needed for chronic UTI's is too expensive.   Today she continues with pelvic bleeding, unsure if hemorrhoids, vaginal bleeding or in her urine. She denies pain. See full review of systems below.

## 2025-03-02 NOTE — HISTORY OF PRESENT ILLNESS
[de-identified] : The patient was diagnosed with endometrial cancer in September 2024 at the age of 58. She presented to GYN in August for post-menopausal bleeding and on 8/26/24 a PAP showed epithelial cell abnormality. Atypical glandular cells present, favor neoplastic. On 9/24/24 she had a transvaginal US: Heterogeneous uterus. Complex structure seen towards the ERNESTINE/Cervix. Possible fibroid measuring 2.7 x 1.8 x 2.1cm. Endometrial lining measures 24.4--36.4mm (TV and TA measurements). Endometrium has a thickened, complex-heterogeneous appearance. On 9/25/24 she underwent 3 endometrial biopsies, and all pathology showed carcinosarcoma. No loss of nuclear expression of MMR proteins (MLH1, MSH2, MSH6, and PMS2). Immunohistochemical stains of the tumor: p53 null (aberrant), p16 positive in the epithelial component. ER and [place] positive. The sarcomatous component has areas with liposarcomatous differentiation, with immunohistochemical stain S100 is patchy positive. On 10/23/24 she had a CT A/P that showed nonspecific tiny nodules in the lungs, 2 mm. Right adrenal mass, 3.7 x 2.6 cm. Complex endometrial mass consistent with clinical history of carcinosarcoma, 9 x 4 cm. Left adnexal above fluid density lesions, 3.7 cm and 2.2 cm. On 10/30/24 she had a follow up MRI abdomen that showed right adrenal adenoma. Choledocholithiasis with mild dilatation of the common bile duct. No intrahepatic biliary dilatation. Cholelithiasis without evidence of acute cholecystitis. On 11/5/24 she is status post a RA TLH, BSO, SLND, Bilateral urterolysis, JAJA, omental biopsy, pelvic washing, mini-laparotomy, and cystoscopy, and pathology showed sentinel lymph node, left external iliac, dissection: One benign lymph node (0/1). [place] lymph node, right external iliac, dissection: One benign lymph node (0/1). Omentum, biopsy: Benign mature adipose. 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 Lymphovascular invasion present. Surgical resection margins negative for tumor. Left ovary with serous borderline tumor, pT1c2 pN0. Benign right ovary and bilateral fallopian tubes. Tissue shows an aberrant p53 null phenotype in the carcinomatous and sarcomatous components. Pelvic wash: negative for malignant cells.  [de-identified] : Medical Hx: Asthma; HTN; HL; DM 2; herniated discs; bipolar d/o; hypothyroidism; seizure d/o (last seizure 20 years ago, followed by neurology); eczema  Surgical Hx: no prior surgeries  Family Hx: maternal uncle prostrate, 60's; maternal uncle, thyroid ca Social Hx: never smoker; ETOH never; single, lives with her sister; disabled [de-identified] : While in rehab (12/4/24-2/6/25) she had multiple UTI's and norovirus. She has followed with uro-gyn and medication needed for chronic UTI's is too expensive.   Today she continues with pelvic bleeding, unsure if hemorrhoids, vaginal bleeding or in her urine. She denies pain. See full review of systems below.

## 2025-03-02 NOTE — RESULTS/DATA
[FreeTextEntry1] : 11/5/24 Path: Pelvic wash: negative for malignant cells.   11/5/24 Path: [place] lymph node, left external iliac, dissection: One benign lymph node (0/1). [place] lymph node, right external iliac, dissection: One benign lymph node (0/1). Omentum, biopsy: Benign mature adipose. 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 Lymphovascular invasion present. Surgical resection margins negative for tumor. Left ovary with serous borderline tumor, pT1c2 pN0. Benign right ovary and bilateral fallopian tubes. Tissue shows an aberrant p53 null phenotype in the carcinomatous and sarcomatous components. Synoptic Summary Ovary, Fallopian Tube, or Primary Perito Procedure: Total hysterectomy and bilateral salpingo-oophorectomy Left Ovary Integrity: Capsule intact Tumor Site: Left ovary Tumor Size: 1.5 Centimeters (cm) Histologic Type: Serous borderline tumor Ovarian Surface Involvement: Present, left Fallopian Tube Surface Involvement: Not identified Other Tissue / Organ Involvement: Not identified Peritoneal / Ascitic Fluid Involvement: Negative for malignant cells Regional Lymph Node Status: All regional lymph nodes negative for tumor cells Number of Lymph Nodes Examined: 2 pTNM Classification (AJCC 8th Edition) pT Category: pT1c2 pN Category: pN0 Additional Findings Additional Findings: See associated synoptic report for part 4  CAP eCP 2024 Q2 Release 4: Endometrium - Hysterectomy Procedure: Total hysterectomy and bilateral salpingo-oophorectomy Tumor Site:  Endometrium: Lower uterine segment Histologic Type: Carcinosarcoma Myometrial Invasion: Present Depth of Myometrial Invasion: 0.5 mm Myometrial Thickness: 1.5 mm Percentage of Myometrial Invasion: 33% Adenomyosis: Not identified Uterine Serosa Involvement: Not identified Lower Uterine Segment Involvement: Present, myoinvasive Other Tissue / Organ Involvement: Not identified Peritoneal / Ascitic Fluid: Negative for malignant cells The International System for Reporting Serous Fluid Cytopathology: Negative for malignancy (NFM) Lymphatic and / or Vascular Invasion: Present Margin Status: All margins negative for invasive carcinoma Regional Lymph Node Status:   All regional lymph nodes negative for tumor cells Lymph Nodes Examined Total Number of Pelvic Nodes Examined: 2 Number of Pelvic [place] Nodes Examined:  2 Total Number of Para-aortic Nodes Examined:  0 Number of Para-aortic [place] Nodes Examined: 0 pTNM Classification pT Category: pT1a pN Category: pN0 Additional Findings: Serous Borderline Tumor of the left ovary, see synoptic summary; focal endometriosis of uterine serosa; benign leiomyomata, calcific atherosclerosis.  10/30/24 MRI Abd: Right adrenal adenoma. Choledocholithiasis with mild dilatation of the common bile duct. No intrahepatic biliary dilatation. Cholelithiasis without evidence of acute cholecystitis.  10/23/24 CT A/P: Nonspecific tiny nodules in the lungs, 2 mm. Right adrenal mass, 3.7 x 2.6 cm. Further evaluation with MRI or triphasic CT may be of benefit. Complex endometrial mass consistent with clinical history of carcinosarcoma, 9 x 4 cm. Left adnexal above fluid density lesions, 3.7 cm and 2.2 cm. Correlation with pelvic ultrasound or MRI may be of benefit if not already performed.  9/25/24 Path: 1. Endometrial biopsy: Carcinosarcoma. 2. Endometrial biopsy: Carcinosarcoma. 3. Endometrial biopsy: Carcinosarcoma. No loss of nuclear expression of MMR proteins (MLH1, MSH2, MSH6, and PMS2). Immunohistochemical stains of the tumor: p53 null (aberrant), p16 positive in the epithelial component. ER and [place] positive. The sarcomatous component has areas with liposarcomatous differentiation, with immunohistochemical stain S100 is patchy positive.   9/24/24 Transvaginal US: Heterogeneous uterus. Complex structure see towards the ERNESTINE/Cervix. Possible fibroid measuring 2.7 x 1.8 x 2.1cm. Endometrial lining measures 24.4--36.4mm (TV and TA measurements). Endometrium has a thickened, complex-heterogeneous appearance.  Blood flow seen minimally throughout with color doppler. Unable to visualize Rt Ovary. Difficult to clearly visualize Ovarian tissue. Mostly Simple Cyst seen measuring 3.2 x 2.9 x 3.3cm with a solid appearing structure seen within measuring 1.4 x 1.0 x 1.6cm. Additional suspected Para-Ovarian Cyst seen measuring 2.4 x 1.6 x 1.8cm. No free fluid noted. Difficult sonogram due to patient body habitu and patient unable to position properly.  8/26/24 PAP: Epithelial cell abnormality. Atypical glandular cells present, favor neoplastic. Advised tissue biopsy for definitive diagnosis.

## 2025-03-02 NOTE — PHYSICAL EXAM
[Restricted in physically strenuous activity but ambulatory and able to carry out work of a light or sedentary nature] : Status 1- Restricted in physically strenuous activity but ambulatory and able to carry out work of a light or sedentary nature, e.g., light house work, office work [Normal] : affect appropriate [de-identified] : wt stable  [de-identified] : well healed abd incisions

## 2025-03-02 NOTE — PHYSICAL EXAM
[Restricted in physically strenuous activity but ambulatory and able to carry out work of a light or sedentary nature] : Status 1- Restricted in physically strenuous activity but ambulatory and able to carry out work of a light or sedentary nature, e.g., light house work, office work [Normal] : affect appropriate [de-identified] : wt stable  [de-identified] : well healed abd incisions

## 2025-03-17 NOTE — RESULTS/DATA
[FreeTextEntry1] : 3/11/25 CT C/A/P: Interval hysterectomy with a large soft tissue 3.7 x 5.8 x 6.8 cm mass at the vaginal cuff and development of numerous scattered pulmonary nodules, 1.0 cm, peritoneal nodules, 2 suspicious right axillary lymph nodes, 1.3 cm, and a large mass within an umbilical hernia (Sister Minnie Nazario node), compatible with metastatic disease. Unchanged right adrenal nodule, possibly representing an adenoma given the interval stability. Continued attention on follow-up is recommended.  11/5/24 Path: Pelvic wash: negative for malignant cells.   11/5/24 Path: [place] lymph node, left external iliac, dissection: One benign lymph node (0/1). [place] lymph node, right external iliac, dissection: One benign lymph node (0/1). Omentum, biopsy: Benign mature adipose. 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 Lymphovascular invasion present. Surgical resection margins negative for tumor. Left ovary with serous borderline tumor, pT1c2 pN0. Benign right ovary and bilateral fallopian tubes. Tissue shows an aberrant p53 null phenotype in the carcinomatous and sarcomatous components. Synoptic Summary Ovary, Fallopian Tube, or Primary Perito Procedure: Total hysterectomy and bilateral salpingo-oophorectomy Left Ovary Integrity: Capsule intact Tumor Site: Left ovary Tumor Size: 1.5 Centimeters (cm) Histologic Type: Serous borderline tumor Ovarian Surface Involvement: Present, left Fallopian Tube Surface Involvement: Not identified Other Tissue / Organ Involvement: Not identified Peritoneal / Ascitic Fluid Involvement: Negative for malignant cells Regional Lymph Node Status: All regional lymph nodes negative for tumor cells Number of Lymph Nodes Examined: 2 pTNM Classification (AJCC 8th Edition) pT Category: pT1c2 pN Category: pN0 Additional Findings Additional Findings: See associated synoptic report for part 4  CAP eCP 2024 Q2 Release 4: Endometrium - Hysterectomy Procedure: Total hysterectomy and bilateral salpingo-oophorectomy Tumor Site:  Endometrium: Lower uterine segment Histologic Type: Carcinosarcoma Myometrial Invasion: Present Depth of Myometrial Invasion: 0.5 mm Myometrial Thickness: 1.5 mm Percentage of Myometrial Invasion: 33% Adenomyosis: Not identified Uterine Serosa Involvement: Not identified Lower Uterine Segment Involvement: Present, myoinvasive Other Tissue / Organ Involvement: Not identified Peritoneal / Ascitic Fluid: Negative for malignant cells The International System for Reporting Serous Fluid Cytopathology: Negative for malignancy (NFM) Lymphatic and / or Vascular Invasion: Present Margin Status: All margins negative for invasive carcinoma Regional Lymph Node Status:   All regional lymph nodes negative for tumor cells Lymph Nodes Examined Total Number of Pelvic Nodes Examined: 2 Number of Pelvic [place] Nodes Examined:  2 Total Number of Para-aortic Nodes Examined:  0 Number of Para-aortic [place] Nodes Examined: 0 pTNM Classification pT Category: pT1a pN Category: pN0 Additional Findings: Serous Borderline Tumor of the left ovary, see synoptic summary; focal endometriosis of uterine serosa; benign leiomyomata, calcific atherosclerosis.  10/30/24 MRI Abd: Right adrenal adenoma. Choledocholithiasis with mild dilatation of the common bile duct. No intrahepatic biliary dilatation. Cholelithiasis without evidence of acute cholecystitis.  10/23/24 CT A/P: Nonspecific tiny nodules in the lungs, 2 mm. Right adrenal mass, 3.7 x 2.6 cm. Further evaluation with MRI or triphasic CT may be of benefit. Complex endometrial mass consistent with clinical history of carcinosarcoma, 9 x 4 cm. Left adnexal above fluid density lesions, 3.7 cm and 2.2 cm. Correlation with pelvic ultrasound or MRI may be of benefit if not already performed.  9/25/24 Path: 1. Endometrial biopsy: Carcinosarcoma. 2. Endometrial biopsy: Carcinosarcoma. 3. Endometrial biopsy: Carcinosarcoma. No loss of nuclear expression of MMR proteins (MLH1, MSH2, MSH6, and PMS2). Immunohistochemical stains of the tumor: p53 null (aberrant), p16 positive in the epithelial component. ER and [place] positive. The sarcomatous component has areas with liposarcomatous differentiation, with immunohistochemical stain S100 is patchy positive.   9/24/24 Transvaginal US: Heterogeneous uterus. Complex structure see towards the ERNESTINE/Cervix. Possible fibroid measuring 2.7 x 1.8 x 2.1cm. Endometrial lining measures 24.4--36.4mm (TV and TA measurements). Endometrium has a thickened, complex-heterogeneous appearance.  Blood flow seen minimally throughout with color doppler. Unable to visualize Rt Ovary. Difficult to clearly visualize Ovarian tissue. Mostly Simple Cyst seen measuring 3.2 x 2.9 x 3.3cm with a solid appearing structure seen within measuring 1.4 x 1.0 x 1.6cm. Additional suspected Para-Ovarian Cyst seen measuring 2.4 x 1.6 x 1.8cm. No free fluid noted. Difficult sonogram due to patient body habitu and patient unable to position properly.  8/26/24 PAP: Epithelial cell abnormality. Atypical glandular cells present, favor neoplastic. Advised tissue biopsy for definitive diagnosis.

## 2025-03-17 NOTE — ASSESSMENT
[Curative] : Goals of care discussed with patient: Curative [FreeTextEntry1] : Addendum 3/16/25: Reviewed 3/11/25 imaging and will discuss with patient starting: dostarlimab (500 mg) or placebo, plus carboplatin (area under the concentration-time curve, 5 mg per milliliter per minute) and paclitaxel (175 mg per square meter of body-surface area), every 3 weeks (six cycles), followed by dostarlimab (1000 mg) or placebo every 6 weeks for up to 3 years.

## 2025-03-17 NOTE — HISTORY OF PRESENT ILLNESS
[de-identified] : The patient was diagnosed with endometrial cancer in September 2024 at the age of 58. She presented to GYN in August for post-menopausal bleeding and on 8/26/24 a PAP showed epithelial cell abnormality. Atypical glandular cells present, favor neoplastic. On 9/24/24 she had a transvaginal US: Heterogeneous uterus. Complex structure seen towards the ERNESTINE/Cervix. Possible fibroid measuring 2.7 x 1.8 x 2.1cm. Endometrial lining measures 24.4--36.4mm (TV and TA measurements). Endometrium has a thickened, complex-heterogeneous appearance. On 9/25/24 she underwent 3 endometrial biopsies, and all pathology showed carcinosarcoma. No loss of nuclear expression of MMR proteins (MLH1, MSH2, MSH6, and PMS2). Immunohistochemical stains of the tumor: p53 null (aberrant), p16 positive in the epithelial component. ER and [place] positive. The sarcomatous component has areas with liposarcomatous differentiation, with immunohistochemical stain S100 is patchy positive. On 10/23/24 she had a CT A/P that showed nonspecific tiny nodules in the lungs, 2 mm. Right adrenal mass, 3.7 x 2.6 cm. Complex endometrial mass consistent with clinical history of carcinosarcoma, 9 x 4 cm. Left adnexal above fluid density lesions, 3.7 cm and 2.2 cm. On 10/30/24 she had a follow up MRI abdomen that showed right adrenal adenoma. Choledocholithiasis with mild dilatation of the common bile duct. No intrahepatic biliary dilatation. Cholelithiasis without evidence of acute cholecystitis. On 11/5/24 she is status post a RA TLH, BSO, SLND, Bilateral urterolysis, JAJA, omental biopsy, pelvic washing, mini-laparotomy, and cystoscopy, and pathology showed sentinel lymph node, left external iliac, dissection: One benign lymph node (0/1). [place] lymph node, right external iliac, dissection: One benign lymph node (0/1). Omentum, biopsy: Benign mature adipose. 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 Lymphovascular invasion present. Surgical resection margins negative for tumor. Left ovary with serous borderline tumor, pT1c2 pN0. Benign right ovary and bilateral fallopian tubes. Tissue shows an aberrant p53 null phenotype in the carcinomatous and sarcomatous components. Pelvic wash: negative for malignant cells.  [de-identified] : Medical Hx: Asthma; HTN; HL; DM 2; herniated discs; bipolar d/o; hypothyroidism; seizure d/o (last seizure 20 years ago, followed by neurology); eczema  Surgical Hx: no prior surgeries  Family Hx: maternal uncle prostrate, 60's; maternal uncle, thyroid ca Social Hx: never smoker; ETOH never; single, lives with her sister; disabled [de-identified] : While in rehab (12/4/24-2/6/25) she had multiple UTI's and norovirus. She has followed with uro-gyn and medication needed for chronic UTI's is too expensive.   Today she continues with vaginal bleeding. Sister reports it as "sporadic" and "Spotting" She denies pain. See full review of systems below.

## 2025-03-17 NOTE — PHYSICAL EXAM
[Restricted in physically strenuous activity but ambulatory and able to carry out work of a light or sedentary nature] : Status 1- Restricted in physically strenuous activity but ambulatory and able to carry out work of a light or sedentary nature, e.g., light house work, office work [Normal] : affect appropriate [de-identified] : wt stable  [de-identified] : well healed abd incisions

## 2025-03-17 NOTE — GOALS
[Patient] : patient [Staff: _____] : staff - [unfilled] [FreeTextEntry1] : Franca Arias [FreeTextEntry2] : sister  [FreeTextEntry3] : [phone] [FreeTextEntry7] : Patient's sister will bring in a copy of HCP to be scanned.

## 2025-06-06 NOTE — HISTORY OF PRESENT ILLNESS
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 She had slow recovery and was in a rehabilitation facility.   Patient had a consultation with radiation oncology, It was discussed with patient to have her follow up CT scans and would further discuss adjuvant tx after CT results are in.   CT C/A/P 3/11/25: IMPRESSION: 1. Interval hysterectomy with a large soft tissue mass at the vaginal cuff and development of numerous scattered pulmonary nodules, peritoneal nodules, 2 suspicious right axillary lymph nodes, and a large mass within an umbilical hernia (Sister Minnie Nazario node), compatible with metastatic disease. 2. Unchanged right adrenal nodule, possibly representing an adenoma given the interval stability. Continued attention on follow-up is recommended.  Patient is currently s/p 3 cycles of carbo/pacilitaxel/dostarlima w/ Dr. Anand.   She presents to the office today for a routine svl visit.

## 2025-06-06 NOTE — REASON FOR VISIT
[FreeTextEntry1] : [facility] [place]. [phone]

## 2025-06-06 NOTE — REASON FOR VISIT
[FreeTextEntry1] : [facility] [place]. [phone]

## 2025-06-06 NOTE — HISTORY OF PRESENT ILLNESS
[FreeTextEntry1] : Pt is a 57 yo with Stage Ia carcinosarcoma s/p TRH, BSO, SLND, bilateral ureterolysis, staging on 11/5/24. She had slow recovery and was in a rehabilitation facility.   Patient had a consultation with radiation oncology, It was discussed with patient to have her follow up CT scans and would further discuss adjuvant tx after CT results are in.   CT C/A/P 3/11/25: IMPRESSION: 1. Interval hysterectomy with a large soft tissue mass at the vaginal cuff and development of numerous scattered pulmonary nodules, peritoneal nodules, 2 suspicious right axillary lymph nodes, and a large mass within an umbilical hernia (Sister Minnie Nazario node), compatible with metastatic disease. 2. Unchanged right adrenal nodule, possibly representing an adenoma given the interval stability. Continued attention on follow-up is recommended.  Patient is currently s/p 3 cycles of carbo/pacilitaxel/dostarlima w/ Dr. Anand.   She presents to the office today for a routine svl visit.

## 2025-07-14 NOTE — REVIEW OF SYSTEMS
[Fatigue] : fatigue [Recent Change In Weight] : ~T recent weight change [Constipation] : constipation [Diarrhea: Grade 0] : Diarrhea: Grade 0 [Vaginal Discharge] : vaginal discharge [Dysmenorrhea/Abn Vaginal Bleeding] : dysmenorrhea/abnormal vaginal bleeding [Negative] : Allergic/Immunologic [Chest Pain] : no chest pain [Shortness Of Breath] : no shortness of breath [Abdominal Pain] : no abdominal pain [FreeTextEntry2] : wt loss noted

## 2025-07-14 NOTE — PHYSICAL EXAM
[Restricted in physically strenuous activity but ambulatory and able to carry out work of a light or sedentary nature] : Status 1- Restricted in physically strenuous activity but ambulatory and able to carry out work of a light or sedentary nature, e.g., light house work, office work [Normal] : affect appropriate [de-identified] : wt loss  [de-identified] : well healed abd incisions

## 2025-07-14 NOTE — HISTORY OF PRESENT ILLNESS
[de-identified] : The patient was diagnosed with endometrial cancer in September 2024 at the age of 58. She presented to GYN in August for post-menopausal bleeding and on 8/26/24 a PAP showed epithelial cell abnormality. Atypical glandular cells present, favor neoplastic. On 9/24/24 she had a transvaginal US: Heterogeneous uterus. Complex structure seen towards the ERNESTINE/Cervix. Possible fibroid measuring 2.7 x 1.8 x 2.1cm. Endometrial lining measures 24.4--36.4mm (TV and TA measurements). Endometrium has a thickened, complex-heterogeneous appearance. On 9/25/24 she underwent 3 endometrial biopsies, and all pathology showed carcinosarcoma. No loss of nuclear expression of MMR proteins (MLH1, MSH2, MSH6, and PMS2). Immunohistochemical stains of the tumor: p53 null (aberrant), p16 positive in the epithelial component. ER and OH positive. The sarcomatous component has areas with liposarcomatous differentiation, with immunohistochemical stain S100 is patchy positive. On 10/23/24 she had a CT A/P that showed nonspecific tiny nodules in the lungs, 2 mm. Right adrenal mass, 3.7 x 2.6 cm. Complex endometrial mass consistent with clinical history of carcinosarcoma, 9 x 4 cm. Left adnexal above fluid density lesions, 3.7 cm and 2.2 cm. On 10/30/24 she had a follow up MRI abdomen that showed right adrenal adenoma. Choledocholithiasis with mild dilatation of the common bile duct. No intrahepatic biliary dilatation. Cholelithiasis without evidence of acute cholecystitis. On 11/5/24 she is status post a RA TLH, BSO, SLND, Bilateral urterolysis, JAJA, omental biopsy, pelvic washing, mini-laparotomy, and cystoscopy, and pathology showed sentinel lymph node, left external iliac, dissection: One benign lymph node (0/1). [place] lymph node, right external iliac, dissection: One benign lymph node (0/1). Omentum, biopsy: Benign mature adipose. 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 Lymphovascular invasion present. Surgical resection margins negative for tumor. Left ovary with serous borderline tumor, pT1c2 pN0. Benign right ovary and bilateral fallopian tubes. Tissue shows an aberrant p53 null phenotype in the carcinomatous and sarcomatous components. Pelvic wash: negative for malignant cells.  [de-identified] : Medical Hx: Asthma; HTN; HL; DM 2; herniated discs; bipolar d/o; hypothyroidism; seizure d/o (last seizure 20 years ago, followed by neurology); eczema  Surgical Hx: no prior surgeries  Family Hx: maternal uncle prostrate, 60's; maternal uncle, thyroid ca Social Hx: never smoker; ETOH never; single, lives with her sister; disabled [de-identified] : Pt has a known history of chronic UTI's, while in rehab (12/4/24-2/6/25) she had multiple UTI's and norovirus. She has followed with uro-gyn and medication needed for chronic UTI's is too expensive. She was recently hospitalized again for a UTI on 4/29 - 5/2/25.   C5 is planned for 7/18 of Carbo / taxol / Dostarlimab. She noted an increase in fatigue after C1 and rested as needed. She has constipation and takes prune juice and senna as needed. She denies mouth sores but has a dry mouth. She is having hair loss. Her wt is down due to changes in taste week 1.  She denies N/V; diarrhea; neuropathy; rash.   She was recently hospitalized at  from 7/6 - 7/11 for presents to the ED via wheelchair from home for evaluation of several days of decreased oral intake, generalized weakness and lethargy. BP in ER 70/40, recovered with fluid resuscitation. Pancytopenia due to chemotherapy, Immunocompromised host: trend cbc w diff, Hgb 7.1, Plt 46, no overt sings of bleeding. s/p 1 U pRBCs. on 7/6 , 1 PRBC 7/11, 1 Platelets 7/11.  Metabolic encephalopathy, multifactorial Sepsis, POA due to Klebsiella UTI; Elevated lithium level; Low cortisol possible adrenal insufficiency with 3x2 right adrenal nodule (endo consulted, 7/10 start hydrocortisone 20 am, 10 pm).

## 2025-07-21 NOTE — REASON FOR VISIT
[Telehealth (audio & video)] : This visit was provided via telehealth using real-time 2-way audio visual technology. [Initial Evaluation] : an initial evaluation [Adrenal Evaluation/Adrenal Disorder] : adrenal evaluation/adrenal disorder [Family Member] : family member [Other: _____] : [unfilled]

## 2025-07-21 NOTE — ASSESSMENT
[FreeTextEntry1] : Pt found to have adrenal insufficienc y after admission at  for weakness hypotension  AM cortisol 3.5 with ACTH 12 done  before 8 AM  ACTH stim test not done  due to severe thrombocytopenia  Will recheck labs  cortisol  with [facility] coming to house before 730 AM  ACTH is frozen so can't be done check LH FSH estradiol Prolactin   dw pt sister sick day rules-  no impendingtravel so will hold on SOlucortf kit  3.5 cm right adrenal nodule- not able to work up but has benign characteristics  on Ct scan  can considre obtaining plasmametanehrines   Hypothryoid   Cont RX with 0.025 mg but on hold right now as  inhsopitla pt was on higher dose  137 mcg for a few days- noand TSh suppressed with elevated t4 T3 - will repeat  with labs next week    T2DM Cont RX no low BS per pt sistee

## 2025-07-21 NOTE — ASSESSMENT
[FreeTextEntry1] : Pt found to have adrenal insufficienc y after admission at  for weakness hypotension  AM cortisol 3.5 with ACTH 12 done  before 8 AM  ACTH stim test not done  due to severe thrombocytopenia  Will recheck labs  cortisol  with [facility] coming to house before 730 AM  ACTH is frozen so can't be done check LH FSH estradiol Prolactin   dw pt sister sick day rules-  no impendingtravel so will hold on SOlucortf kit  3.5 cm right adrenal nodule- not able to work up but has benign characteristics  on Ct scan  can considre obtaining plasmametanehrines   Hypothryoid   Cont RX with 0.025 mg but on hold right now as  inhsopitla pt was on higher dose  137 mcg for a few days- noand TSh suppressed with elevated t4 T3 - will repeat  with labs next week    T2DM Cont RX no low BS per pt sistee

## 2025-07-21 NOTE — HISTORY OF PRESENT ILLNESS
[Home] : at home, [unfilled] , at the time of the visit. [Other Location: e.g. Home (Enter Location, City,State)___] : at [unfilled] [Telehealth (audio & video)] : This visit was provided via telehealth using real-time 2-way audio visual technology. [Verbal consent obtained from patient] : the patient, [unfilled] [FreeTextEntry1] : TEB   Av   Pt here for follow up from    dx with adrenal insufficiecny   Cortisol 3.5 ACTh 12 done at 630Am    stim test not doen as pt with severe thrombocytopenia  (plt 23K) so given cortisol readign and  clincial signs of Hypotension etc  started Hydrocortisone 20 mng in Am and 10 mg in PM   MRI done of brain and pituitary - no hypophysisitis or apoplexy   Other confounding varaibel is pt is on meds for bipolar as well  which can affect cortisol levels   but pt was in a period of stress so even shomartyl have had higher readings  Pt currently receivng immunotherapy for metastatic endometrial cancer  mets to  lung, umbilical area  Sister Francisco Lange Node peritonela noduls axillary LN   pt is s/p  3 cycles of cehmo and immunotherapy  and hysterectomy   consideration beign given for  XRT     pt seen with sister then pt had to go lay down   PMH  T2DM  metastatic endometrial cacner  Hypothryoidsm on LT4 0.025 mg qd   Bipoar disrder   PSH  Hysterectomy  Bladder surgery    FH  Mother  DM HTN  Soc HX  nonsmoker   no alcohol use   all [place]